# Patient Record
Sex: MALE | Race: WHITE | NOT HISPANIC OR LATINO | URBAN - METROPOLITAN AREA
[De-identification: names, ages, dates, MRNs, and addresses within clinical notes are randomized per-mention and may not be internally consistent; named-entity substitution may affect disease eponyms.]

---

## 2022-01-08 ENCOUNTER — EMERGENCY (EMERGENCY)
Facility: HOSPITAL | Age: 27
LOS: 1 days | Discharge: HOME | End: 2022-01-08
Admitting: EMERGENCY MEDICINE
Payer: OTHER MISCELLANEOUS

## 2022-01-08 VITALS
DIASTOLIC BLOOD PRESSURE: 87 MMHG | HEART RATE: 97 BPM | HEIGHT: 70 IN | OXYGEN SATURATION: 96 % | TEMPERATURE: 98 F | RESPIRATION RATE: 20 BRPM | SYSTOLIC BLOOD PRESSURE: 130 MMHG | WEIGHT: 164.91 LBS

## 2022-01-08 DIAGNOSIS — M79.642 PAIN IN LEFT HAND: ICD-10-CM

## 2022-01-08 DIAGNOSIS — M25.562 PAIN IN LEFT KNEE: ICD-10-CM
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
## 2022-01-08 DIAGNOSIS — M25.561 PAIN IN RIGHT KNEE: ICD-10-CM

## 2022-01-08 DIAGNOSIS — S60.051A CONTUSION OF RIGHT LITTLE FINGER WITHOUT DAMAGE TO NAIL, INITIAL ENCOUNTER: ICD-10-CM

## 2022-01-08 DIAGNOSIS — M79.641 PAIN IN RIGHT HAND: ICD-10-CM

## 2022-01-08 DIAGNOSIS — S60.021A CONTUSION OF RIGHT INDEX FINGER WITHOUT DAMAGE TO NAIL, INITIAL ENCOUNTER: ICD-10-CM

## 2022-01-08 PROCEDURE — 73140 X-RAY EXAM OF FINGER(S): CPT | Mod: 26,RT

## 2022-01-08 PROCEDURE — 99284 EMERGENCY DEPT VISIT MOD MDM: CPT | Mod: 25

## 2022-01-08 PROCEDURE — 99053 MED SERV 10PM-8AM 24 HR FAC: CPT

## 2022-01-08 RX ORDER — BACITRACIN ZINC 500 UNIT/G
1 OINTMENT IN PACKET (EA) TOPICAL ONCE
Refills: 0 | Status: COMPLETED | OUTPATIENT
Start: 2022-01-08 | End: 2022-01-08

## 2022-01-08 RX ORDER — TETANUS TOXOID, REDUCED DIPHTHERIA TOXOID AND ACELLULAR PERTUSSIS VACCINE, ADSORBED 5; 2.5; 8; 8; 2.5 [IU]/.5ML; [IU]/.5ML; UG/.5ML; UG/.5ML; UG/.5ML
0.5 SUSPENSION INTRAMUSCULAR ONCE
Refills: 0 | Status: COMPLETED | OUTPATIENT
Start: 2022-01-08 | End: 2022-01-08

## 2022-01-08 RX ORDER — IBUPROFEN 200 MG
600 TABLET ORAL ONCE
Refills: 0 | Status: COMPLETED | OUTPATIENT
Start: 2022-01-08 | End: 2022-01-08

## 2022-01-08 RX ADMIN — TETANUS TOXOID, REDUCED DIPHTHERIA TOXOID AND ACELLULAR PERTUSSIS VACCINE, ADSORBED 0.5 MILLILITER(S): 5; 2.5; 8; 8; 2.5 SUSPENSION INTRAMUSCULAR at 22:59

## 2022-01-08 RX ADMIN — Medication 600 MILLIGRAM(S): at 22:59

## 2022-01-08 RX ADMIN — Medication 1 APPLICATION(S): at 23:08

## 2022-01-08 NOTE — ED PROVIDER NOTE - PATIENT PORTAL LINK FT
You can access the FollowMyHealth Patient Portal offered by [facility] by registering at the following website: [email or web address]. By joining [email or web address]’s FollowMyHealth portal, you will also be able to view your health information using other applications (apps) compatible with our system. You can access the FollowMyHealth Patient Portal offered by [facility] by registering at the following website: [email or web address]. By joining [email or web address]’s FollowMyHealth portal, you will also be able to view your health information using other applications (apps) compatible with our system.

## 2022-01-08 NOTE — ED PROVIDER NOTE - PHYSICAL EXAMINATION
RUE:superficial abrasions over R 5th finger, TTP over 4th and 5th phalanx, no edema, no erythema, no ecchymosis, BL knees  LUE: TTP over dorsum of hand, no edema, no erythema, no crepitus, no ecchymosis
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
## 2022-01-09 PROCEDURE — 73140 X-RAY EXAM OF FINGER(S): CPT | Mod: 26,RT
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
## 2022-01-09 NOTE — ED ADULT NURSE NOTE - NSIMPLEMENTINTERV_GEN_ALL_ED
Implemented All Universal Safety Interventions:  [place] to call system. Call bell, personal items and telephone within reach. Instruct patient to call for assistance. Room bathroom lighting operational. Non-slip footwear when patient is off stretcher. Physically safe environment: no spills, clutter or unnecessary equipment. Stretcher in lowest position, wheels locked, appropriate side rails in place.

## 2022-07-07 NOTE — ED ADULT NURSE NOTE - NS ED NOTE ABUSE RESPONSE YN
Yes Opioid Pregnancy And Lactation Text: These medications can lead to premature delivery and should be avoided during pregnancy. These medications are also present in breast milk in small amounts.